# Patient Record
Sex: MALE | Race: WHITE
[De-identification: names, ages, dates, MRNs, and addresses within clinical notes are randomized per-mention and may not be internally consistent; named-entity substitution may affect disease eponyms.]

---

## 2019-06-02 ENCOUNTER — HOSPITAL ENCOUNTER (EMERGENCY)
Dept: HOSPITAL 77 - KA.ED | Age: 29
Discharge: HOME | End: 2019-06-02
Payer: COMMERCIAL

## 2019-06-02 DIAGNOSIS — F17.210: ICD-10-CM

## 2019-06-02 DIAGNOSIS — J40: Primary | ICD-10-CM

## 2019-06-02 RX ADMIN — DEXAMETHASONE SODIUM PHOSPHATE ONE MG: 4 INJECTION, SOLUTION INTRAMUSCULAR; INTRAVENOUS at 09:31

## 2019-06-02 NOTE — EDM.PDOC
ED HPI GENERAL MEDICAL PROBLEM





- General


Chief Complaint: General


Stated Complaint: CHEST CONGESTION


Time Seen by Provider: 06/02/19 09:01


Source of Information: Reports: Patient


History Limitations: Reports: No Limitations





- History of Present Illness


INITIAL COMMENTS - FREE TEXT/NARRATIVE: 





Patient is a 28-year-old gentleman who presents to the emergency department 

this morning with a complaint of fever, cough, and upper respiratory symptoms.  

He states symptoms began 5 days ago and have progressively worsened.  Patient 

states throughout the night he woke up drenched with sweat and today his lungs 

feel wheezy.  Patient states that he traveled 2 weeks ago from [place] to 

[place].  Patient denies any previous pulmonary issues, blood in sputum, 

nausea, vomiting, diarrhea, blood in stool, abdominal pain, headache, or stiff 

neck.


Onset: Gradual


Onset Date: 05/28/19


Duration: Day(s):


Location: Reports: Chest


Quality: Reports: Ache


Severity: Mild


Improves with: Reports: None


Worsens with: Reports: None


Associated Symptoms: Reports: Cough, cough w sputum, Fever/Chills





- Related Data


 Allergies











Allergy/AdvReac Type Severity Reaction Status Date / Time


 


No Known Allergies Allergy   Verified 06/02/19 08:45











Home Meds: 


 Home Meds





Albuterol [Proventil HFA] 6.7 gm IH QID #1 inhaler 06/02/19 [Rx]


Azithromycin [Zithromax] 500 mg PO DAILY #2 tab 06/02/19 [Rx]


predniSONE [Prednisone] 20 mg PO DAILY #3 tab.ds.pk 06/02/19 [Rx]











Past Medical History


Musculoskeletal History: Reports: Fracture





Social & Family History





- Family History


Family Medical History: Noncontributory





- Tobacco Use


Smoking Status *Q: Current Every Day Smoker


Years of Tobacco use: 10


Packs/Tins Daily: 1


Used Tobacco, but Quit: No


Second Hand Smoke Exposure: Yes





- Caffeine Use


Caffeine Use: Reports: Coffee





- Recreational Drug Use


Recreational Drug Use: No





ED ROS GENERAL





- Review of Systems


Review Of Systems: ROS reveals no pertinent complaints other than HPI.


Constitutional: Reports: Fever, Chills, Night Sweats, Diaphoresis


HEENT: Reports: No Symptoms


Respiratory: Reports: Wheezing, Cough, Sputum.  Denies: Hemoptysis


Cardiovascular: Reports: No Symptoms


Endocrine: Reports: No Symptoms


GI/Abdominal: Reports: No Symptoms


: Reports: No Symptoms


Musculoskeletal: Reports: No Symptoms


Skin: Reports: No Symptoms


Neurological: Reports: No Symptoms


Psychiatric: Reports: No Symptoms


Hematologic/Lymphatic: Reports: No Symptoms


Immunologic: Reports: No Symptoms





ED EXAM, GENERAL





- Physical Exam


Exam: See Below


Exam Limited By: No Limitations


General Appearance: Alert, WD/WN, No Apparent Distress


Eye Exam: Bilateral Eye: Normal Inspection


Ears: Normal External Exam, Normal Canal, Normal TMs


Nose: Normal Inspection, Normal Mucosa, No Blood


Throat/Mouth: Normal Inspection, Normal Oropharynx, No Airway Compromise


Head: Atraumatic, Normocephalic


Neck: Normal Inspection, Supple, Non-Tender.  No: Lymphadenopathy (L), 

Lymphadenopathy (R)


Respiratory/Chest: No Respiratory Distress, Wheezing (Inspiratory and 

expiratory throughout)


Cardiovascular: Regular Rate, Rhythm, No Murmur


GI/Abdominal: Normal Bowel Sounds, Soft, Non-Tender, No Organomegaly, No 

Distention, No Abnormal Bruit, No Mass


Extremities: Normal Inspection, No Pedal Edema


Neurological: Alert, Oriented, Normal Cognition


Psychiatric: Normal Affect, Normal Mood


Skin Exam: Warm, Dry, Intact, Normal Color, No Rash


Lymphatic: No Adenopathy





Course





- Vital Signs


Last Recorded V/S: 





 Last Vital Signs











Temp  97.1 F   06/02/19 08:39


 


Pulse  77   06/02/19 08:39


 


Resp  16   06/02/19 08:39


 


BP  98/71   06/02/19 08:39


 


Pulse Ox  94 L  06/02/19 08:39














- Orders/Labs/Meds


Orders: 





 Active Orders 24 hr











 Category Date Time Status


 


 RT Aerosol Therapy [RC] ASDIRECTED Care  06/02/19 09:02 Ordered


 


 CXR [Chest 2V] [CR] Stat Exams  06/02/19 08:49 Ordered











Meds: 





Medications














Discontinued Medications














Generic Name Dose Route Start Last Admin





  Trade Name Freq  PRN Reason Stop Dose Admin


 


Albuterol/Ipratropium  3 ml  06/02/19 09:01  





  Duoneb 3.0-0.5 Mg/3 Ml  NEB  06/02/19 09:02  





  ONETIME ONE   





     





     





     





     


 


Dexamethasone  8 mg  06/02/19 09:01  





  Dexamethasone  IM  06/02/19 09:02  





  ONETIME ONE   





     





     





     





     














- Radiology Interpretation


Free Text/Narrative:: 





Chest x-ray shows some streaking in the left lung.  Chest x-ray read negative 

by radiology.





- Re-Assessments/Exams


Free Text/Narrative Re-Assessment/Exam: 





06/02/19 09:28


Patient afebrile, nontoxic appearing, vital signs stable.  Patient given 

Decadron, albuterol, 1 g Rocephin IM, and 500 mg Zithromax in the ER.  

Prescription for albuterol, Zithromax, and prednisone.





Departure





- Departure


Time of Disposition: 09:29


Disposition: Home, Self-Care 01


Condition: Good


Clinical Impression: 


 Bronchitis








- Discharge Information


Instructions:  Upper Respiratory Infection, Adult, Easy-to-Read, Metered Dose 

Inhaler (No Spacer Used), How to Use a Metered Dose Inhaler


Referrals: 


PCP,None [Primary Care Provider] - 


Sherri Mccauley MD [Physician] - 


Additional Instructions: 


Follow-up with Dr. Whatley in 2 days.  Return to emergency room sooner if 

symptoms continue or worsen.





- My Orders


Last 24 Hours: 





My Active Orders





06/02/19 08:49


CXR [Chest 2V] [CR] Stat 





06/02/19 09:02


RT Aerosol Therapy [RC] ASDIRECTED 














- Assessment/Plan


Last 24 Hours: 





My Active Orders





06/02/19 08:49


CXR [Chest 2V] [CR] Stat 





06/02/19 09:02


RT Aerosol Therapy [RC] ASDIRECTED 











Assessment:: 





Bronchitis


Plan: 





Follow-up with PCP in 2 days

## 2019-06-02 NOTE — CR
______________________________________________________________________________   

  

[id number] RAD/RAD Chest PA And Lateral  

EXAM:   

   

 RAD Chest PA And Lateral  

   

 CLINICAL DATA:   

   

 CHEST CONGESTION  

   

 COMPARISON:   

   

 NO PREVIOUS SIMILAR EXAM IS AVAILABLE.  

   

 FINDINGS:   

   

 The lungs are clear.  

   

 The cardiomediastinal contour is normal.  

   

 The regional bones and soft tissues are unremarkable.  

   

 IMPRESSION:  

   

 NO ACUTE PROCESS.  

   

 Electronically signed by Hima rGeenberg MD on 6/2/2019 9:23 AM  

   

  

Hima Greenberg MD                 

 06/02/19 0925    

  

Thank you for allowing us to participate in the care of your patient.